# Patient Record
Sex: MALE | Race: WHITE | Employment: FULL TIME | URBAN - METROPOLITAN AREA
[De-identification: names, ages, dates, MRNs, and addresses within clinical notes are randomized per-mention and may not be internally consistent; named-entity substitution may affect disease eponyms.]

---

## 2022-03-28 ENCOUNTER — TELEPHONE (OUTPATIENT)
Dept: FAMILY MEDICINE CLINIC | Age: 27
End: 2022-03-28

## 2022-03-28 NOTE — TELEPHONE ENCOUNTER
----- Message from Keturah Signs sent at 3/25/2022  3:45 PM EDT -----  Subject: Message to Provider    QUESTIONS  Information for Provider? This patient needs a pcp and wants to know if   could be set up with Donnie Wells did get a referral from a friend. call   him back to schedule a new pt appt has [facility] ins[facility]  ---------------------------------------------------------------------------  --------------  CALL BACK INFO  What is the best way for the office to contact you? OK to leave message on   voicemail  Preferred Call Back Phone Number? [phone]  ---------------------------------------------------------------------------  --------------  SCRIPT ANSWERS  Relationship to Patient?  Self

## 2022-04-05 ENCOUNTER — OFFICE VISIT (OUTPATIENT)
Dept: FAMILY MEDICINE CLINIC | Age: 27
End: 2022-04-05
Payer: COMMERCIAL

## 2022-04-05 VITALS
SYSTOLIC BLOOD PRESSURE: 138 MMHG | HEIGHT: 66 IN | DIASTOLIC BLOOD PRESSURE: 86 MMHG | BODY MASS INDEX: 19.51 KG/M2 | HEART RATE: 97 BPM | WEIGHT: 121.4 LBS | OXYGEN SATURATION: 100 %

## 2022-04-05 DIAGNOSIS — Z76.89 ENCOUNTER TO ESTABLISH CARE: ICD-10-CM

## 2022-04-05 DIAGNOSIS — Z11.59 NEED FOR HEPATITIS C SCREENING TEST: ICD-10-CM

## 2022-04-05 DIAGNOSIS — F12.10 CANNABIS ABUSE, DAILY USE: ICD-10-CM

## 2022-04-05 DIAGNOSIS — Z00.00 WELL ADULT EXAM: Primary | ICD-10-CM

## 2022-04-05 DIAGNOSIS — F41.9 ANXIETY: ICD-10-CM

## 2022-04-05 DIAGNOSIS — Z11.4 ENCOUNTER FOR SCREENING FOR HIV: ICD-10-CM

## 2022-04-05 DIAGNOSIS — J45.20 MILD INTERMITTENT ASTHMA WITHOUT COMPLICATION: ICD-10-CM

## 2022-04-05 DIAGNOSIS — F90.0 ATTENTION DEFICIT HYPERACTIVITY DISORDER (ADHD), PREDOMINANTLY INATTENTIVE TYPE: ICD-10-CM

## 2022-04-05 DIAGNOSIS — F33.1 MODERATE EPISODE OF RECURRENT MAJOR DEPRESSIVE DISORDER (HCC): ICD-10-CM

## 2022-04-05 PROBLEM — J30.9 ALLERGIC RHINITIS: Status: ACTIVE | Noted: 2022-04-05

## 2022-04-05 PROBLEM — Z80.3 FAMILY HISTORY OF BREAST CANCER: Status: ACTIVE | Noted: 2022-04-05

## 2022-04-05 PROBLEM — J45.909 ASTHMA: Status: ACTIVE | Noted: 2022-04-05

## 2022-04-05 PROCEDURE — 99385 PREV VISIT NEW AGE 18-39: CPT | Performed by: NURSE PRACTITIONER

## 2022-04-05 PROCEDURE — 99202 OFFICE O/P NEW SF 15 MIN: CPT | Performed by: NURSE PRACTITIONER

## 2022-04-05 RX ORDER — PYRIDOXINE HCL (VITAMIN B6) 100 MG
100 TABLET ORAL DAILY
COMMUNITY

## 2022-04-05 RX ORDER — LORATADINE 10 MG/1
1 TABLET ORAL DAILY PRN
COMMUNITY

## 2022-04-05 RX ORDER — DESVENLAFAXINE 50 MG/1
50 TABLET, EXTENDED RELEASE ORAL DAILY
Qty: 30 TABLET | Refills: 5 | Status: SHIPPED | OUTPATIENT
Start: 2022-04-05 | End: 2022-10-02

## 2022-04-05 RX ORDER — ALBUTEROL SULFATE 90 UG/1
2 AEROSOL, METERED RESPIRATORY (INHALATION) EVERY 6 HOURS PRN
Qty: 18 G | Refills: 3 | Status: SHIPPED | OUTPATIENT
Start: 2022-04-05

## 2022-04-05 SDOH — ECONOMIC STABILITY: FOOD INSECURITY: WITHIN THE PAST 12 MONTHS, YOU WORRIED THAT YOUR FOOD WOULD RUN OUT BEFORE YOU GOT MONEY TO BUY MORE.: NEVER TRUE

## 2022-04-05 SDOH — ECONOMIC STABILITY: FOOD INSECURITY: WITHIN THE PAST 12 MONTHS, THE FOOD YOU BOUGHT JUST DIDN'T LAST AND YOU DIDN'T HAVE MONEY TO GET MORE.: NEVER TRUE

## 2022-04-05 ASSESSMENT — PATIENT HEALTH QUESTIONNAIRE - PHQ9
5. POOR APPETITE OR OVEREATING: 0
SUM OF ALL RESPONSES TO PHQ QUESTIONS 1-9: 18
2. FEELING DOWN, DEPRESSED OR HOPELESS: 3
4. FEELING TIRED OR HAVING LITTLE ENERGY: 3
7. TROUBLE CONCENTRATING ON THINGS, SUCH AS READING THE NEWSPAPER OR WATCHING TELEVISION: 3
1. LITTLE INTEREST OR PLEASURE IN DOING THINGS: 0
SUM OF ALL RESPONSES TO PHQ QUESTIONS 1-9: 18
3. TROUBLE FALLING OR STAYING ASLEEP: 3
10. IF YOU CHECKED OFF ANY PROBLEMS, HOW DIFFICULT HAVE THESE PROBLEMS MADE IT FOR YOU TO DO YOUR WORK, TAKE CARE OF THINGS AT HOME, OR GET ALONG WITH OTHER PEOPLE: 1
9. THOUGHTS THAT YOU WOULD BE BETTER OFF DEAD, OR OF HURTING YOURSELF: 0
6. FEELING BAD ABOUT YOURSELF - OR THAT YOU ARE A FAILURE OR HAVE LET YOURSELF OR YOUR FAMILY DOWN: 3
SUM OF ALL RESPONSES TO PHQ9 QUESTIONS 1 & 2: 3
8. MOVING OR SPEAKING SO SLOWLY THAT OTHER PEOPLE COULD HAVE NOTICED. OR THE OPPOSITE, BEING SO FIGETY OR RESTLESS THAT YOU HAVE BEEN MOVING AROUND A LOT MORE THAN USUAL: 3

## 2022-04-05 ASSESSMENT — COLUMBIA-SUICIDE SEVERITY RATING SCALE - C-SSRS
2. HAVE YOU ACTUALLY HAD ANY THOUGHTS OF KILLING YOURSELF?: NO
1. WITHIN THE PAST MONTH, HAVE YOU WISHED YOU WERE DEAD OR WISHED YOU COULD GO TO SLEEP AND NOT WAKE UP?: YES
6. HAVE YOU EVER DONE ANYTHING, STARTED TO DO ANYTHING, OR PREPARED TO DO ANYTHING TO END YOUR LIFE?: NO

## 2022-04-05 ASSESSMENT — SOCIAL DETERMINANTS OF HEALTH (SDOH): HOW HARD IS IT FOR YOU TO PAY FOR THE VERY BASICS LIKE FOOD, HOUSING, MEDICAL CARE, AND HEATING?: NOT HARD AT ALL

## 2022-04-05 NOTE — PROGRESS NOTES
SUBJECTIVE:   Susanne Melgoza is a 32 y.o. male presenting to [facility] care. Former PCP: Saintclair Pinion, last eval 17      Lives with: self  Employment: [employer] supervisor     History of Present Illness:      Anxiety: THC daily to help manage anxiety. Associated with mood swings, agitation, states \"when having a good time- it is a really good time\", Possible manic episode 3/17- states called parents at 0400 screaming at them. Associated with heavy alcohol intake that day. Other symptoms include: apathy, anhedonia, isolation, low self esteem, no self worth, hopelessness, trouble falling asleep with daytime fatigue. Also having thoughts= \"it would be fine if I feel asleep and never woke up again. \" denies SI, denies plans of suicide. Citalopram in past- stopped taking for SI. Told friend what he was feeling and stopped medication. ADHD: no meds since adolescence. Struggling with attention and concentration. Finds himself rambling. Asthma: well controlled. Occasionally experiences SOB, usually associated with exercise. Has been using father's albuterol inhaler and helping alleviated SOB. Denies cough, wheezing or chest tightness. Patient Active Problem List   Diagnosis    Allergic rhinitis    Asthma    Anxiety    Family history of breast cancer    Cannabis abuse, daily use       Past Medical History:   Diagnosis Date    ADHD (attention deficit hyperactivity disorder)     Anxiety     Asthma 2022    Former smoker     total years smokin       History reviewed. No pertinent surgical history.     Social History     Socioeconomic History    Marital status: None     Spouse name: None    Number of children: None    Years of education: None    Highest education level: None   Occupational History    None   Tobacco Use    Smoking status: Former Smoker     Packs/day: 0.50     Types: Cigarettes    Smokeless tobacco: Never Used   Vaping Use    Vaping Use: Some days    Substances: THC Substance and Sexual Activity    Alcohol use: Not Currently    Drug use: Yes     Types: Marijuana Josiah Tobias)    Sexual activity: None   Other Topics Concern    None   Social History Narrative    None     Social Determinants of Health     Financial Resource Strain: Low Risk     Difficulty of Paying Living Expenses: Not hard at all   Food Insecurity: No Food Insecurity    Worried About Running Out of Food in the Last Year: Never true    Asuncion of Food in the Last Year: Never true   Transportation Needs:     Lack of Transportation (Medical): Not on file    Lack of Transportation (Non-Medical):  Not on file   Physical Activity:     Days of Exercise per Week: Not on file    Minutes of Exercise per Session: Not on file   Stress:     Feeling of Stress : Not on file   Social Connections:     Frequency of Communication with Friends and Family: Not on file    Frequency of Social Gatherings with Friends and Family: Not on file    Attends Restoration Services: Not on file    Active Member of [address] [place] [address] or Organizations: Not on file    Attends Club or Organization Meetings: Not on file    Marital Status: Not on file   Intimate Partner Violence:     Fear of Current or Ex-Partner: Not on file    Emotionally Abused: Not on file    Physically Abused: Not on file    Sexually Abused: Not on file   Housing Stability:     Unable to Pay for Housing in the Last Year: Not on file    Number of [place] in the Last Year: Not on file    Unstable Housing in the Last Year: Not on file       Family History   Problem Relation Age of Onset    Breast Cancer Mother     OCD Father     Anxiety Disorder Brother     Cerebral Aneurysm Paternal Grandmother     Cancer Paternal Grandfather        Current Outpatient Medications   Medication Sig Dispense Refill    loratadine (CLARITIN) 10 MG tablet Take 1 tablet by mouth daily as needed      pyridoxine (B-6) 100 MG tablet Take 100 mg by mouth daily      Cetirizine HCl (ZYRTEC PO) Take by mouth      desvenlafaxine succinate (PRISTIQ) 50 MG TB24 extended release tablet Take 1 tablet by mouth daily 30 tablet 5    albuterol sulfate HFA (PROVENTIL HFA) 108 (90 Base) MCG/ACT inhaler Inhale 2 puffs into the lungs every 6 hours as needed for Wheezing 18 g 3     No current facility-administered medications for this visit. Allergies: Cefixime     Health Maintenance   Topic Date Due    Hepatitis C screen  Never done    Varicella vaccine (1 of 2 - 2-dose childhood series) Never done    COVID-19 Vaccine (1) Never done    Depression Monitoring  Never done    HIV screen  Never done    DTaP/Tdap/Td vaccine (2 - Td or Tdap) 05/29/2017    Flu vaccine (Season Ended) 09/01/2022    Hepatitis A vaccine  Aged Out    Hepatitis B vaccine  Aged Out    Hib vaccine  Aged Out    Meningococcal (ACWY) vaccine  Aged Out    Pneumococcal 0-64 years Vaccine  Aged Out       Last eye exam: unknown  Last dental exam: 3 yrs  Exercising regularly: not at present  Balanced diet:  No     ROS:    A comprehensive review of systems was negative except for what was noted in the HPI. PHYSICAL EXAMINATION:  /86   Pulse 97   Ht 5' 5.5\" (1.664 m)   Wt 121 lb 6.4 oz (55.1 kg)   SpO2 100%   BMI 19.89 kg/m²   Constitutional: Oriented to person, place, and time. Appears well-developed and well-nourished. No distress. HENT:   Head: Normocephalic and atraumatic. Ears: Hearing, tympanic membrane, external ear and ear canal normal.   Nose: Nose normal.   Mouth/Throat: Uvula is midline, oropharynx is clear and moist and mucous membranes are normal.   Eyes: Conjunctivae and EOM are normal. Pupils are equal, round, and reactive to light. Neck: Normal range of motion. Neck supple. Normal carotid pulses and no JVD present. Carotid bruit is not present. No tracheal deviation present. No mass and no thyromegaly present.    Cardiovascular: Normal rate, regular rhythm, S1 normal, S2 normal, normal heart sounds and intact distal pulses. No murmur heard. Pulmonary/Chest: Effort normal and breath sounds normal. No stridor. No respiratory distress. No decreased breath sounds. No wheezes. No rhonchi. No rales. Abdominal: Soft. Normal appearance and bowel sounds are normal. No distension, no abdominal bruit and no mass. There is no hepatomegaly. No tenderness. Musculoskeletal: Normal range of motion. No edema. Lymphadenopathy:     No cervical adenopathy. No supraclavicular adenopathy. Neurological: Alert and oriented to person, place, and time. No tremor. Exhibits normal muscle tone. Coordination and gait normal.   Skin: Skin is warm and dry. No rash noted. Not diaphoretic. No cyanosis. No pallor. Nails show no clubbing. Psychiatric:  Normal mood and affect. Speech is normal and behavior is normal. Cognition and memory are normal.       ASSESSMENT:   Complete physical.  1. Well adult exam    2. Anxiety    3. Moderate episode of recurrent major depressive disorder ([address]    4. Mild intermittent asthma without complication    5. Cannabis abuse, daily use    6. Attention deficit hyperactivity disorder (ADHD), predominantly inattentive type    7. Encounter for screening for HIV    8. Need for hepatitis C screening test    9. Encounter to establish care        PLAN:   1. Well adult exam  -     CBC with Auto Differential; Future  -     Comprehensive Metabolic Panel, Fasting; Future  -     Lipid, Fasting; Future  -     TSH with Reflex to FT4; Future    2. Anxiety  Uncontrolled   Trial new start-     desvenlafaxine succinate (PRISTIQ) 50 MG TB24 extended release tablet; Take 1 tablet by mouth daily    3. Moderate episode of recurrent major depressive disorder ([address]  Uncontrolled  Discussed with pt, not convinced he is having manic episodes. Episode in question occurred with large amount of alcohol. Trial -     desvenlafaxine succinate (PRISTIQ) 50 MG TB24 extended release tablet;  Take 1 tablet by mouth daily  See pt instructions for psychiatry referral  Avoiding SSRI d/t SI in past.   Pt denies suicidal plan at this time. States if thoughts worsened would discuss with friend like he did in past.     4. Mild intermittent asthma without complication  Stable   -     albuterol sulfate HFA (PROVENTIL HFA) 108 (90 Base) MCG/ACT inhaler; Inhale 2 puffs into the lungs every 6 hours as needed for Wheezing    5. Cannabis abuse, daily use  Cessation strongly advised    6. Attention deficit hyperactivity disorder (ADHD), predominantly inattentive type  Discussed with patient will treat anxiety/depression first. Will need to stop daily THC use prior to me prescribing stimulants  Pt verbalized understanding    7. Encounter for screening for HIV  -     HIV Screen; Future    8. Need for hepatitis C screening test  -     Hepatitis C Antibody; Future    9. Encounter to establish care  [facility] and [facility]  Office policies reviewed today    See pt instructions  F/u 6 weeks, sooner prn  Discussed use, benefit, and side effects of prescribed medications. All patient questions answered. Pt voiced understanding.

## 2022-04-05 NOTE — PATIENT INSTRUCTIONS
Lab hours: Monday- Friday 7:30 am-4 pm  No appointment necessary, first come first serve. Sign in at . Nothing but water for 10 hours for fasting labs. Increase water 24 hours before lab draw. Out Patient Yuniel Kasper MD  Location: [address] [place] [address], [address] [place] [address]      Phone: [phone]    [facility]  Location: Multiple locations in the [facility]      Phone: [phone] or [address]  For new patient inquires call: [phone]      Office Information:  Main and Administrative Office:  [facility], [address] [facility]; [facility] bus [address], [address], [address]  [phone] for general information  [phone] for new intake questions (no appointment necessary, MonFri, 8:30 amnoon)      Patient Education        Well Visit, Ages 25 to 48: Care Instructions  Overview     Well visits can help you stay healthy. Your doctor has checked your overall health and may have suggested ways to take good care of yourself. Your doctor also may have recommended tests. At home, you can help prevent illness withhealthy eating, regular exercise, and other steps. Follow-up care is a key part of your treatment and safety. Be sure to make and go to all appointments, and call your doctor if you are having problems. It's also a good idea to know your test results and keep alist of the medicines you take. How can you care for yourself at home?  Get screening tests that you and your doctor decide on. Screening helps find diseases before any symptoms appear.  Eat healthy foods. Choose fruits, vegetables, whole grains, protein, and low-fat dairy foods. Limit fat, especially saturated fat. Reduce salt in your diet.  Limit alcohol. If you are a man, have no more than 2 drinks a day or 14 drinks a week.  If you are a woman, have no more than 1 drink a day or 7 drinks a week.   Get at least 30 minutes of physical activity on most days of the week. Walking is a good choice. You also may want to do other activities, such as running, swimming, cycling, or playing tennis or team sports. Discuss any changes in your exercise program with your doctor.  Reach and stay at a healthy weight. This will lower your risk for many problems, such as obesity, diabetes, heart disease, and high blood pressure.  Do not smoke or allow others to smoke around you. If you need help quitting, talk to your doctor about stop-smoking programs and medicines. These can increase your chances of quitting for good.  Care for your mental health. It is easy to get weighed down by worry and stress. Learn strategies to manage stress, like deep breathing and mindfulness, and stay connected with your family and community. If you find you often feel sad or hopeless, talk with your doctor. Treatment can help.  Talk to your doctor about whether you have any risk factors for sexually transmitted infections (STIs). You can help prevent STIs if you wait to have sex with a new partner (or partners) until you've each been tested for STIs. It also helps if you use condoms (male or female condoms) and if you limit your sex partners to one person who only has sex with you. Vaccines are available for some STIs, such as HPV.  Use birth control if it's important to you to prevent pregnancy. Talk with your doctor about the choices available and what might be best for you.  If you think you may have a problem with alcohol or drug use, talk to your doctor. This includes prescription medicines (such as amphetamines and opioids) and illegal drugs (such as cocaine and methamphetamine). Your doctor can help you figure out what type of treatment is best for you.  Protect your skin from too much sun. When you're outdoors from 10 a.m. to 4 p.m., stay in the shade or cover up with clothing and a hat with a wide brim.  Wear sunglasses that block UV rays. Even when it's cloudy, put broad-spectrum sunscreen (SPF 30 or higher) on any exposed skin.  See a dentist one or two times a year for checkups and to have your teeth cleaned.  Wear a seat belt in the car. When should you call for help? Watch closely for changes in your health, and be sure to contact your doctor if you have any problems or symptoms that concern you. Where can you learn more? Go to https://BlueKite.Navidog. org and sign in to your Unda account. Enter P072 in the Intrapace box to learn more about \"Well Visit, Ages 25 to 48: Care Instructions. \"     If you do not have an account, please click on the \"Sign Up Now\" link. Current as of: October 6, 2021               Content Version: 13.2  © 6531-9282 Healthwise, Incorporated. Care instructions adapted under license by [facility] ([facility]). If you have questions about a medical condition or this instruction, always ask your healthcare professional. [address] any warranty or liability for your use of this information.

## 2022-05-25 ENCOUNTER — NURSE TRIAGE (OUTPATIENT)
Dept: OTHER | Facility: CLINIC | Age: 27
End: 2022-05-25

## 2022-05-25 ENCOUNTER — OFFICE VISIT (OUTPATIENT)
Dept: FAMILY MEDICINE CLINIC | Age: 27
End: 2022-05-25
Payer: COMMERCIAL

## 2022-05-25 VITALS
WEIGHT: 121.2 LBS | HEART RATE: 83 BPM | RESPIRATION RATE: 16 BRPM | DIASTOLIC BLOOD PRESSURE: 72 MMHG | OXYGEN SATURATION: 98 % | BODY MASS INDEX: 19.86 KG/M2 | SYSTOLIC BLOOD PRESSURE: 112 MMHG

## 2022-05-25 DIAGNOSIS — R05.9 COUGH: ICD-10-CM

## 2022-05-25 DIAGNOSIS — F41.9 ANXIETY: ICD-10-CM

## 2022-05-25 DIAGNOSIS — J45.901 MODERATE ASTHMA WITH EXACERBATION, UNSPECIFIED WHETHER PERSISTENT: Primary | ICD-10-CM

## 2022-05-25 PROCEDURE — 99213 OFFICE O/P EST LOW 20 MIN: CPT | Performed by: NURSE PRACTITIONER

## 2022-05-25 RX ORDER — PREDNISONE 20 MG/1
TABLET ORAL
Qty: 30 TABLET | Refills: 0 | Status: SHIPPED | OUTPATIENT
Start: 2022-05-25

## 2022-05-25 ASSESSMENT — ENCOUNTER SYMPTOMS
RHINORRHEA: 0
SHORTNESS OF BREATH: 1
WHEEZING: 0
CHEST TIGHTNESS: 1
SORE THROAT: 0
COUGH: 1
SINUS PRESSURE: 0

## 2022-05-25 ASSESSMENT — PATIENT HEALTH QUESTIONNAIRE - PHQ9
6. FEELING BAD ABOUT YOURSELF - OR THAT YOU ARE A FAILURE OR HAVE LET YOURSELF OR YOUR FAMILY DOWN: 0
2. FEELING DOWN, DEPRESSED OR HOPELESS: 0
SUM OF ALL RESPONSES TO PHQ QUESTIONS 1-9: 0
4. FEELING TIRED OR HAVING LITTLE ENERGY: 0
9. THOUGHTS THAT YOU WOULD BE BETTER OFF DEAD, OR OF HURTING YOURSELF: 0
SUM OF ALL RESPONSES TO PHQ QUESTIONS 1-9: 0
5. POOR APPETITE OR OVEREATING: 0
8. MOVING OR SPEAKING SO SLOWLY THAT OTHER PEOPLE COULD HAVE NOTICED. OR THE OPPOSITE, BEING SO FIGETY OR RESTLESS THAT YOU HAVE BEEN MOVING AROUND A LOT MORE THAN USUAL: 0
SUM OF ALL RESPONSES TO PHQ QUESTIONS 1-9: 0
SUM OF ALL RESPONSES TO PHQ QUESTIONS 1-9: 0
7. TROUBLE CONCENTRATING ON THINGS, SUCH AS READING THE NEWSPAPER OR WATCHING TELEVISION: 0
10. IF YOU CHECKED OFF ANY PROBLEMS, HOW DIFFICULT HAVE THESE PROBLEMS MADE IT FOR YOU TO DO YOUR WORK, TAKE CARE OF THINGS AT HOME, OR GET ALONG WITH OTHER PEOPLE: 0
3. TROUBLE FALLING OR STAYING ASLEEP: 0
SUM OF ALL RESPONSES TO PHQ9 QUESTIONS 1 & 2: 0
1. LITTLE INTEREST OR PLEASURE IN DOING THINGS: 0

## 2022-05-25 NOTE — LETTER
[address]  Phone: [phone]  Fax: [phone]    NEAL Cullen CNP        May 25, 2022     Patient: Melissa Champion   YOB: 1995   Date of Visit: 5/25/2022       To Whom it May Concern:    Melissa Champion was seen in my office on 5/25/2022. He may return to work on 5/30/22. If you have any questions or concerns, please don't hesitate to call.     Sincerely,         NEAL Cullen CNP

## 2022-05-25 NOTE — PROGRESS NOTES
SUBJECTIVE:  Pt is a of 32 y.o. male comes in today with   Chief Complaint   Patient presents with    Asthma     Pt states he gets sob, cough, chest tightness, only when active. He states he over used his albuterol yesterday. Patient presenting today for evaluation of asthma. SOB for 3 days, worsens with exertion. Associated with chest tightness, cough and wheezing. Used albuterol inhaler approx 18 times yesterday. Dry cough. Low grade fever day 1, since resolved. Slight nasal congestion. Denies HA, sore throat, ear pain or runny nose. Denies abd pain, N/V/D. No known COVID exposures  Received  1 COVID vaccines. Anxiety: per pt, Pristiq helping immensely. Anxiety not affecting him daily anymore. Feels more in control. Prior to Visit Medications    Medication Sig Taking? Authorizing Provider   loratadine (CLARITIN) 10 MG tablet Take 1 tablet by mouth daily as needed Yes Historical Provider, MD   pyridoxine (B-6) 100 MG tablet Take 100 mg by mouth daily Yes Historical Provider, MD   Cetirizine HCl (ZYRTEC PO) Take by mouth Yes Historical Provider, MD   desvenlafaxine succinate (PRISTIQ) 50 MG TB24 extended release tablet Take 1 tablet by mouth daily Yes NEAL Acosta CNP   albuterol sulfate HFA (PROVENTIL HFA) 108 (90 Base) MCG/ACT inhaler Inhale 2 puffs into the lungs every 6 hours as needed for Wheezing Yes NEAL Acosta CNP         Patient's allergies, past medical, surgical, social and family histories werereviewed and updated as appropriate. Review of Systems   Constitutional: Negative for chills, fatigue and fever. HENT: Positive for congestion (mild). Negative for ear pain, postnasal drip, rhinorrhea, sinus pressure and sore throat. Respiratory: Positive for cough, chest tightness and shortness of breath. Negative for wheezing. Neurological: Negative for headaches. Psychiatric/Behavioral: Negative for agitation, sleep disturbance and suicidal ideas.  The patient is not nervous/anxious. Physical Exam  Vitals reviewed. Constitutional:       Appearance: He is well-developed. HENT:      Right Ear: Tympanic membrane normal.      Left Ear: Tympanic membrane normal.      Nose: Nose normal.      Mouth/Throat:      Pharynx: Uvula midline. No posterior oropharyngeal erythema. Cardiovascular:      Rate and Rhythm: Normal rate and regular rhythm. Heart sounds: Normal heart sounds. Pulmonary:      Effort: Pulmonary effort is normal.      Breath sounds: Examination of the right-upper field reveals wheezing. Examination of the left-upper field reveals wheezing. Examination of the right-middle field reveals wheezing. Examination of the right-lower field reveals wheezing. Examination of the left-lower field reveals wheezing. Wheezing (end inspir. ) present. Lymphadenopathy:      Cervical: No cervical adenopathy. Skin:     General: Skin is warm and dry. Neurological:      Mental Status: He is alert and oriented to person, place, and time. Vitals:    05/25/22 1125   BP: 112/72   Pulse: 83   Resp: 16   SpO2: 98%   Weight: 121 lb 3.2 oz (55 kg)             ASSESSMENT:  1. Moderate asthma with exacerbation, unspecified whether persistent    2. Cough    3. Anxiety        PLAN:  1. Moderate asthma with exacerbation, unspecified whether persistent  Worsening   -     predniSONE (DELTASONE) 20 MG tablet; 4 po daily for 3 days, 3 for 3 days, 2 for 3 days, 1 for 3 days, then stop  Continue Albuterol inhaler- 2 puffs every 4 hours  Criteria for emergent care reviewed    2. Cough  -     COVID-19: awaiting results  Will prescribe Paxlovid if (+)  Advised to stay home from work until results back- work note given  Quarantine guidelines reviewed if test positive. Rest and push fluids  Symtomatic treatment: Tylenol / Advil, rest, increase fluids. May also use: Robitussin DM or Delsym.        3. Anxiety  Improving, continue Pristiq    Advised to complete fasting labs  See pt instructions  F/u 5-7 days if no improvement, sooner if symptomsworsen. Discussed use, benefit, and side effects of prescribed medications. All patient questions answered. Pt voiced understanding.

## 2022-05-25 NOTE — TELEPHONE ENCOUNTER
Received call from Priti at [facility] with Red Flag Complaint. Subjective: Caller states \"I am not sure if it my asthma or if I have URI. 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 2 days ago I woke up feeling like I couldn't breath and overused my inhaler which caused tightness in my chest. Along with that I am experiencing SOB and I am wheezing a lot. My nose is runny. I think I am coughing up my excess Albuterol. \"     Current Symptoms: wheezing with exertion. SOB with exertion. Sporadic coughing. Onset: 2 days ago; sudden    Associated Symptoms: chest tightness associated when he gets his episodes/exertion    Pain Severity: 0/10; N/A; none-muscle soreness in back. States related to coughing so much. Temperature: denies     What has been tried: Albuterol briefly alleviates s/s. 2 puffs. LMP: NA Pregnant: NA    Recommended disposition: See in Office Today    Care advice provided, patient verbalizes understanding; denies any other questions or concerns; instructed to call back for any new or worsening symptoms. Patient/Caller agrees with recommended disposition; writer provided warm transfer to Newsome Zoran at [facility] for appointment scheduling     Attention Provider: Thank you for allowing me to participate in the care of your patient. The patient was connected to triage in response to information provided to the ECC/PSC. Please do not respond through this encounter as the response is not directed to a shared pool.           Reason for Disposition   Asthma medicine (nebulizer or inhaler) is needed more frequently than every 4 hours to keep you comfortable    Protocols used: ASTHMA ATTACK-ADULT-[place]

## 2022-05-25 NOTE — PATIENT INSTRUCTIONS
Patient Education        Asthma in Adults: Care Instructions  Overview     Asthma makes it hard for you to breathe. During an asthma attack, the airways swell and narrow. Severe asthma attacks can be dangerous, but you can usually prevent them. Controlling asthma and treating symptoms before they get bad canhelp you avoid bad attacks. You may also avoid future trips to the doctor. Follow-up care is a key part of your treatment and safety. Be sure to make and go to all appointments, and call your doctor if you are having problems. It's also a good idea to know your test results and keep alist of the medicines you take. How can you care for yourself at home?  Follow your asthma action plan so you can manage your symptoms at home. An asthma action plan will help you prevent and control airway reactions and will tell you what to do during an asthma attack. If you do not have an asthma action plan, work with your doctor to build one.  Take your asthma medicine exactly as prescribed. Medicine plays an important role in controlling asthma. Talk to your doctor right away if you have any questions about what to take and how to take it. ? Use your quick-relief medicine when you have symptoms of an asthma attack. Some people need to use quick-relief medicine before they exercise to prevent asthma symptoms. Albuterol is a quick-relief medicine that is often used. In some cases, a certain type of controller inhaler is used as a quick-relief medicine. Ask your doctor what to use for quick relief. ? Take your controller medicine. If you have symptoms often, you will likely need to take it every day. Controller medicine usually includes an inhaled corticosteroid. The goal is to prevent problems before they occur. ? If your doctor prescribed corticosteroid pills to use during an attack, take them as directed. They may take hours to work, but they may shorten the attack and help you breathe better. ?  Keep your quick-relief medicine with you at all times.  Talk to your doctor before using other medicines. Some medicines, such as aspirin, can cause asthma attacks in some people.  Check yourself for asthma symptoms to know which step to follow in your action plan. Watch for things like being short of breath, having chest tightness, coughing, and wheezing. Also notice if symptoms wake you up at night or if you get tired quickly when you exercise.  If you have a peak flow meter, use it to check how well you are breathing. This can help you predict when an asthma attack is going to occur. Then you can take medicine to prevent the asthma attack or make it less severe.  See your doctor regularly. These visits will help you learn more about asthma and what you can do to control it. Your doctor will monitor your treatment to make sure the medicine is helping you.  Keep track of your asthma attacks and your treatment. After you have had an attack, write down what triggered it, what helped end it, and any concerns you have about your asthma action plan. Take your diary when you see your doctor. You can then review your asthma action plan and decide if it is working.  Do not smoke or allow others to smoke around you. Avoid smoky places. Smoking makes asthma worse. If you need help quitting, talk to your doctor about stop-smoking programs and medicines. These can increase your chances of quitting for good.  Learn what triggers an asthma attack for you, and avoid the triggers when you can. Common triggers include colds, smoke, air pollution, dust, pollen, mold, pets, cockroaches, stress, and cold air.  Avoid colds and the flu. Talk to your doctor about getting a pneumococcal vaccine shot. If you have had one before, ask your doctor whether you need a second dose. Get a flu vaccine every fall. If you must be around people with colds or the flu, wash your hands often. When should you call for help?    Call 911 anytime you think you may need emergency care. For example, call if:     You have severe trouble breathing. Call your doctor now or seek immediate medical care if:     Your symptoms do not get better after you have followed your asthma action plan.      You cough up yellow, dark brown, or bloody mucus (sputum). Watch closely for changes in your health, and be sure to contact your doctor if:     Your coughing and wheezing get worse.      You need to use quick-relief medicine on more than 2 days a week within a month (unless it is just for exercise).      You need help figuring out what is triggering your asthma attacks. Where can you learn more? Go to https://Consumer Agent Portal (CAP)peVisual IQeweb.Own Products. org and sign in to your [employer] account. Enter P597 in the [employer] box to learn more about \"Asthma in Adults: Care Instructions. \"     If you do not have an account, please click on the \"Sign Up Now\" link. Current as of: July 6, 2021               Content Version: 13.2  © 2006-2022 [email or web address]. Care instructions adapted under license by [facility] ([facility]). If you have questions about a medical condition or this instruction, always ask your healthcare professional. [place] [address] any warranty or liability for your use of this information. Patient Education        Learning About Coronavirus [phone])  What is coronavirus (COVID-19)? COVID-19 is a disease caused by a type of coronavirus. This illness was firstfound in December 2019. It has since spread worldwide. Coronaviruses are a large group of viruses. They cause the common cold. They also cause more serious illnesses like [place] East respiratory syndrome (MERS) and severe acute respiratory syndrome (SARS). COVID-19 is caused by a novelcoronavirus. That means it's a new type that has not been seen in people before. What are the symptoms? COVID-19 symptoms may include:   Fever.  Cough.  Trouble breathing.    Chills or repeated shaking with chills.  Muscle and body aches.  Headache.  Sore throat.  New loss of taste or smell.  Vomiting.  Diarrhea. In severe cases, COVID-19 can cause pneumonia and make it hard to breathewithout help from a machine. It can cause death. How is it diagnosed? COVID-19 is diagnosed with a viral test. This may also be called a PCR test or antigen test. It looks for evidence of the virus in your breathing passages orlungs (respiratory system). The test is most often done on a sample from the nose, throat, or lungs. It's sometimes done on a sample of saliva. One way a sample is collected is byputting a long swab into the back of your nose. If you have questions about COVID-19 testing, ask your doctor or go to cdc.govto use the COVID-19 Viral Testing Tool. How is it treated? Mild cases of COVID-19 can be treated at home. Serious cases need treatment in the hospital. Treatment may include medicines to reduce symptoms, plus breathing support such as oxygen therapy or a ventilator. Some people may beplaced on their belly to help their oxygen levels. Treatments that may help people who have COVID-19 include:  Antiviral medicines. These medicines treat viral infections. Immune-based therapy. These medicines help the immune system fight COVID-19. Examples include monoclonal antibodies. Blood thinners. These medicines help prevent blood clots. People with severe illness are at risk for blood clots. How can you protect yourself and others?  Get vaccinated and boosted.  Avoid sick people and stay away from others if you are sick.  Stay at least 6 feet away from other people.  Avoid crowds, especially inside.  Get tested for COVID-19 before you have an indoor visit with people that don't live with you.  Cover your mouth with a tissue when you cough or sneeze.  Wash your hands often, especially after you cough or sneeze. Use soap and water, and scrub for at least 20 seconds. If soap and water aren't available, use an alcohol-based hand .  Avoid touching your mouth, nose, and eyes. Be sure to follow all instructions from the [facility] and your local health authorities. Here are some examples of specific precautions you may need totake.  If you are not fully vaccinated and boosted:  ? Wear a mask if you have to go to public areas.  Even if you're fully vaccinated and boosted, there's still a chance you can get and spread COVID-19. If you live in an area where COVID-19 is spreading quickly, wear a mask if you have to go to indoor public areas. You might also want to wear a mask in crowded outdoor areas as well as public indoor spaces if you:  ? Have certain health conditions. ? Live with someone who has a compromised immune system. ? Live with someone who is not fully vaccinated.  If you have been exposed to the virus and are not fully vaccinated and boosted:  ? Talk to your doctor as soon as you can. Your doctor might have you take medicine to help prevent serious illness. ? Get a COVID-19 test. You may need to be tested more than once. ? Stay home. Try to separate from other people where you live. Don't go to school, work, or public areas. ? Wear a mask around other people for a full 10 days. Avoid travel and stay away from people at high risk for serious illness. ? Watch for symptoms.  If you have been exposed and you are fully vaccinated and boosted:  ? Talk to your doctor as soon as you can. Your doctor may have you take medicine to help prevent serious illness. ? Get a COVID-19 test. You may need to be tested more than once. ? Wear a mask around other people for a full 10 days. Avoid travel and stay away from people at high risk for serious illness. ? Watch for symptoms. If you're sick or test positive for COVID-19:   Talk to your doctor as soon as you can. Your doctor may have you take medicine to help prevent serious illness.    Get a COVID-19 test unless you have already been tested. You may need to be tested more than once.  Stay home. Leave only if you need to get medical care.  Wear a mask whenever you're around other people for a full 10 days.  Avoid travel and stay away from people at high risk for serious illness.  Limit contact with pets and people in your home. If possible, stay in a separate bedroom and use a separate bathroom.  Clean and disinfect your home every day. Use household  and disinfectant wipes or sprays. Take special care to clean things that you touch with your hands. If you have questions about COVID-19 testing, ask your doctor or go to cdc.govto use the COVID-19 Viral Testing Tool. How can you self-isolate when you have COVID-19? If you have COVID-19, there are things you can do to help avoid spreading thevirus to others.  Limit contact with people in your home. If possible, stay in a separate bedroom and use a separate bathroom.  Wear a mask when you are around other people.  If you have to leave home, avoid crowds and try to stay at least 6 feet away from other people.  Avoid contact with pets and other animals.  Cover your mouth and nose with a tissue when you cough or sneeze. Then throw it in the trash right away.  Wash your hands often, especially after you cough or sneeze. Use soap and water, and scrub for at least 20 seconds. If soap and water aren't available, use an alcohol-based hand .  Don't share personal household items. These include bedding, towels, cups and glasses, and eating utensils. [address] in the warmest water allowed for the fabric type, and dry it completely. It's okay to wash other people's laundry with yours.  Clean and disinfect your home. Use household  and disinfectant wipes or sprays. When should you call for help? Call 911 anytime you think you may need emergency care.  For example, call if you have life-threatening symptoms, such as:     You have severe trouble breathing. (You can't talk at all.)      You have constant chest pain or pressure.      You are severely dizzy or lightheaded.      You are confused or can't think clearly.      You have pale, gray, or blue-colored skin or lips.      You pass out (lose consciousness) or are very hard to wake up.      You have loss of balance or trouble walking.      You have trouble seeing out of one or both eyes.      You have weakness or drooping on one side of the face.      You have weakness or numbness in an arm or a leg.      You have trouble speaking.      You have a severe headache.      You have a seizure. Call your doctor now or seek immediate medical care if:     You have moderate trouble breathing. (You can't speak a full sentence.)      You are coughing up blood.      You have signs of low blood pressure. These include feeling lightheaded; being too weak to stand; and having cold, pale, clammy skin. Watch closely for changes in your health, and be sure to contact your doctor if:     Your symptoms get worse.      You are not getting better as expected.      You have new or worse symptoms of anxiety, depression, nightmares, or flashbacks. Call before you go to the doctor's office. Follow their instructions. And wear a mask. Where can you learn more? Go to https://[employer].MOgene. org and sign in to your Starline account. Enter C008 in the [facility] box to learn more about \"Learning About Coronavirus (COVID-19). \"     If you do not have an account, please click on the \"Sign Up Now\" link. Current as of: July 1, 2021               Content Version: 13.2  © 5726-3940 Healthwise, Incorporated. Care instructions adapted under license by [facility] ([facility]). If you have questions about a medical condition or this instruction, always ask your healthcare professional. [place] [address] any warranty or liability for your use of this information.

## 2022-05-25 NOTE — LETTER
[address]  Phone: [phone]  Fax: [phone]    NEAL Zavala CNP        May 25, 2022     Patient: Ortega Hay   YOB: 1995   Date of Visit: 5/25/2022       To Whom it May Concern:    Ortega Hay was seen in my office on 5/25/2022. He may return to work on 5/27/22. If you have any questions or concerns, please don't hesitate to call.     Sincerely,         NEAL Zavala CNP

## 2022-05-26 LAB — SARS-COV-2: NOT DETECTED

## 2023-01-25 DIAGNOSIS — F33.1 MODERATE EPISODE OF RECURRENT MAJOR DEPRESSIVE DISORDER (HCC): ICD-10-CM

## 2023-01-25 DIAGNOSIS — F41.9 ANXIETY: ICD-10-CM

## 2023-01-25 NOTE — TELEPHONE ENCOUNTER
Medication:   Requested Prescriptions     Pending Prescriptions Disp Refills    desvenlafaxine succinate (PRISTIQ) 50 MG TB24 extended release tablet [Pharmacy Med Name: Desvenlafaxine Succinate ER Oral Tablet Extended Release 24 Hour 50 MG] 30 tablet 0     Sig: TAKE 1 TABLET BY MOUTH EVERY DAY        Last Filled:      Patient Phone Number: [phone] (home)     Last appt: 5/25/2022   Next appt: Visit date not found    Last OARRS: No flowsheet data found.     [facility]

## 2023-01-31 RX ORDER — DESVENLAFAXINE SUCCINATE 50 MG/1
TABLET, EXTENDED RELEASE ORAL
Qty: 90 TABLET | Refills: 0 | Status: SHIPPED | OUTPATIENT
Start: 2023-01-31

## 2023-01-31 NOTE — TELEPHONE ENCOUNTER
Spoke with patient he states he is not sure when he can come in for an appointment. He will call our office to schedule ov.